# Patient Record
Sex: FEMALE | Race: WHITE | URBAN - METROPOLITAN AREA
[De-identification: names, ages, dates, MRNs, and addresses within clinical notes are randomized per-mention and may not be internally consistent; named-entity substitution may affect disease eponyms.]

---

## 2017-04-29 ENCOUNTER — VIRTUAL VISIT (OUTPATIENT)
Dept: FAMILY MEDICINE | Facility: OTHER | Age: 57
End: 2017-04-29

## 2017-05-25 NOTE — PROGRESS NOTES
"Date:   Clinician: Jesse Monteiro  Clinician NPI: [id number]  Patient: catherine estrada  Patient : 1960  Patient Address: [address] [place] [address]  Patient Phone: [phone]  Visit Protocol: URI  Patient Summary:  catherine is a 57 year old ( : 1960 ) female who initiated a Zip for a presumed sinus infection. When asked the question \"Please sign me up to receive news, health information and promotions. \", catherine responded \"No\".     Her symptoms started gradually 1 week ago and consist of cough, rhinitis, myalgias, nausea, post-nasal drainage, nasal congestion, loss of appetite, and malaise.   She denies itchy eyes, hoarse voice, dysphagia, dyspnea, vomiting, sore throat, chest pain, ear pain, chills, and fever. She denies a history of facial surgery.   Her moderate nasal secretions are yellow. Her moderate facial pain or pressure feels worse when bending over or leaning forward and is located on both sides of her head. The facial pain or pressure started after the onset of other URI symptoms.  catherine has a moderate headache. The headache did not start before her other symptoms and is located on both sides of her head.   In the past year catherine has been diagnosed with one (1) episodes of sinusitis. When asked to feel her neck she could not tell if lymph nodes were enlarged. She denies axillary lymphadenopathy.   Her mild (a few coughs/hr) productive cough is more bothersome at night. She believes the cough is not caused by post-nasal drainage. Her cough produces yellow sputum.    catherine denies having COPD or other chronic lung disease.   Pulse: Not measured beats in 10 seconds.   catherine started taking an ace inhibitor more than two weeks ago.   Weight (in lbs): 203   She states she is not pregnant and denies breastfeeding. She no longer menstruates.   catherine does not smoke or use smokeless tobacco.   Additional information as reported by the patient (free " text): seven days ago started with deep cough extreme weakness through week cough got better, but still weak. Just yesterday had sudden dizziness come over me and extreme weakness again and now a little unsteady but my head hurts and still weak. Did take some mucinex-expectorant when the deep cough wasn't producing anything.  MEDICATIONS:  Lisinopril  , ALLERGIES:    Patient free text response:   Ativan family of drugs    Clinician Response:  Dear catherine,  Based on the information you have provided, you likely have viral sinusitis  commonly called a head cold.   I am prescribing fluticasone propionate nasal (Flonase) 50 mcg/spray. Take one or two inhalations in each nostril one time a day. There are no refills with this prescription.   Unless your are allergic to the over-the-counter medication(s) below, I recommend using:   A sinus irrigation kit such as Sinus Rinse, Neti Pot, SinuCleanse (or store brand). Be sure to use sterile or previously boiled water to prevent unwanted infections.   Guaifenesin + dextromethorphan (Robitussin DM, Mucinex DM). This is an over-the-counter medication that does not require a prescription.   A decongestant such as pseudoephedrine (Sudafed or store brand) to help your symptoms. This is an over-the-counter medication that does not require a prescription.   Ibuprofen. Take 1-3 200mg tablets (200-600mg) every 8 hours to help with the discomfort. Make sure to take the ibuprofen with food. Do not exceed 2400mg in 24 hours. This is an over-the-counter medication that does not require a prescription.   Drink plenty of liquids, especially water and take time to rest your body. This may mean taking a nap or going to bed earlier. Your body is fighting an infection and liquids and rest will improve the pace of recovery. Remember to regularly wash your hands and avoid close contact with others to prevent spreading your infection.   Diagnosis: Viral Sinusitis  Diagnosis ICD:  J01.90  Prescription: fluticasone propionate (Flonase) 50mcg nasal spray 16 gm, 30 days supply. Take one or two inhalations in each nostril one time a day. Refills: 0, Refill as needed: no, Allow substitutions: yes

## 2017-05-27 ENCOUNTER — HEALTH MAINTENANCE LETTER (OUTPATIENT)
Age: 57
End: 2017-05-27